# Patient Record
Sex: MALE | Race: WHITE | Employment: FULL TIME | URBAN - METROPOLITAN AREA
[De-identification: names, ages, dates, MRNs, and addresses within clinical notes are randomized per-mention and may not be internally consistent; named-entity substitution may affect disease eponyms.]

---

## 2018-01-10 NOTE — MISCELLANEOUS
Signatures   Electronically signed by : García Granger MD; Jul 8 2016  3:01PM EST                       (Author)

## 2019-02-27 PROBLEM — Z13.6 ENCOUNTER FOR LIPID SCREENING FOR CARDIOVASCULAR DISEASE: Status: ACTIVE | Noted: 2019-02-27

## 2019-02-27 PROBLEM — Z13.220 ENCOUNTER FOR LIPID SCREENING FOR CARDIOVASCULAR DISEASE: Status: ACTIVE | Noted: 2019-02-27

## 2019-02-27 PROBLEM — K21.9 GASTROESOPHAGEAL REFLUX DISEASE: Status: ACTIVE | Noted: 2019-02-27

## 2019-02-27 PROBLEM — R73.09 ELEVATED GLUCOSE LEVEL: Status: ACTIVE | Noted: 2019-02-27

## 2019-02-27 PROBLEM — F41.9 ANXIETY: Status: ACTIVE | Noted: 2019-02-27

## 2019-04-10 ENCOUNTER — HOSPITAL ENCOUNTER (OUTPATIENT)
Dept: LAB | Age: 44
Discharge: HOME | End: 2019-04-10

## 2019-04-10 PROCEDURE — 88305 TISSUE EXAM BY PATHOLOGIST: CPT

## 2019-04-10 PROCEDURE — 88312 SPECIAL STAINS GROUP 1: CPT

## 2019-09-23 PROBLEM — Z13.6 ENCOUNTER FOR LIPID SCREENING FOR CARDIOVASCULAR DISEASE: Status: RESOLVED | Noted: 2019-02-27 | Resolved: 2019-09-23

## 2019-09-23 PROBLEM — Z13.220 ENCOUNTER FOR LIPID SCREENING FOR CARDIOVASCULAR DISEASE: Status: RESOLVED | Noted: 2019-02-27 | Resolved: 2019-09-23

## 2020-01-21 ENCOUNTER — HOSPITAL ENCOUNTER (OUTPATIENT)
Dept: GENERAL RADIOLOGY | Age: 45
Discharge: HOME | End: 2020-01-21
Payer: COMMERCIAL

## 2020-01-21 PROCEDURE — 72040 X-RAY EXAM NECK SPINE 2-3 VW: CPT

## 2020-02-17 ENCOUNTER — HOSPITAL ENCOUNTER (OUTPATIENT)
Dept: MRI IMAGING | Age: 45
Discharge: HOME | End: 2020-02-17
Attending: FAMILY MEDICINE
Payer: COMMERCIAL

## 2020-02-17 DIAGNOSIS — M54.2 NECK PAIN: ICD-10-CM

## 2020-02-17 PROCEDURE — 72141 MRI NECK SPINE W/O DYE: CPT

## 2020-02-19 NOTE — PROGRESS NOTES
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 there is no disc herniation or spinal stenosis however. We can refer him to neurosurgery to discuss management options.